# Patient Record
Sex: MALE | Race: WHITE | NOT HISPANIC OR LATINO | URBAN - METROPOLITAN AREA
[De-identification: names, ages, dates, MRNs, and addresses within clinical notes are randomized per-mention and may not be internally consistent; named-entity substitution may affect disease eponyms.]

---

## 2024-11-22 ENCOUNTER — OFFICE VISIT (OUTPATIENT)
Dept: URGENT CARE | Age: 39
End: 2024-11-22
Payer: COMMERCIAL

## 2024-11-22 VITALS
SYSTOLIC BLOOD PRESSURE: 166 MMHG | DIASTOLIC BLOOD PRESSURE: 105 MMHG | TEMPERATURE: 98.6 F | OXYGEN SATURATION: 98 % | HEART RATE: 95 BPM

## 2024-11-22 DIAGNOSIS — U07.1 COVID: Primary | ICD-10-CM

## 2024-11-22 DIAGNOSIS — R05.9 COUGH, UNSPECIFIED TYPE: ICD-10-CM

## 2024-11-22 DIAGNOSIS — J02.9 SORE THROAT: ICD-10-CM

## 2024-11-22 LAB
POC RAPID INFLUENZA A: NEGATIVE
POC RAPID INFLUENZA B: NEGATIVE
POC RAPID STREP: NEGATIVE
POC SARS-COV-2 AG BINAX: ABNORMAL

## 2024-11-22 RX ORDER — METHYLPREDNISOLONE 4 MG/1
TABLET ORAL
Qty: 21 TABLET | Refills: 0 | Status: SHIPPED | OUTPATIENT
Start: 2024-11-22 | End: 2024-11-29

## 2024-11-22 ASSESSMENT — ENCOUNTER SYMPTOMS
CHILLS: 0
FATIGUE: 1
FEVER: 0
CHEST TIGHTNESS: 0
NAUSEA: 0
DIARRHEA: 0
WHEEZING: 0
RHINORRHEA: 1
TROUBLE SWALLOWING: 0
CONSTIPATION: 0
SHORTNESS OF BREATH: 0
COUGH: 1
SORE THROAT: 1
PALPITATIONS: 0

## 2024-11-22 NOTE — LETTER
November 22, 2024     Patient: Alexy Win Jr.   YOB: 1985   Date of Visit: 11/22/2024       To Whom It May Concern:    Alexy Win was seen in my clinic on 11/22/2024 at 9:00 am. Please excuse Alexy for his absence from work until fever free for 24-48 hours and symptoms improved. Expected return 11/25/2024         Sincerely,         Stephenie Pina PA-C        CC: No Recipients

## 2024-11-22 NOTE — PROGRESS NOTES
Urgent Care Virtual Video Visit    Patient Location: ***  Provider Location: {[facility] Locations:59058}    Video visit completed with realtime synchronous video/audio connection. Informed consent was obtained from the patient. Patient was made aware that my evaluation and diagnosis are limited due to the fact that we are not in the same room during the interview and that this is a virtual encounter that took place via videoconferencing. Patient verbalized understanding.     Patient disposition: { Disposition:83393}    Electronically signed by Stephenie Pina PA-C  9:06 AM

## 2024-11-22 NOTE — PATIENT INSTRUCTIONS
Recommended continuing tylenol/ibuprofen for fever. Salt water gargles, steam inhalation    Negative strep and flu testing.     At this time positive covid infection advised to continue supportive measures    Monitor for worsening symptoms, fever not reduced with tylenol/ibuprofen, chest pain, trouble breathing/shortness of breath    Return to work after 24-48 fever free without use of tylenol/ibuprofen.     Otherwise follow up with pediatrician.

## 2024-11-22 NOTE — PROGRESS NOTES
Subjective   Patient ID: Alexy Win Jr. is a 39 y.o. male. They present today with a chief complaint of Sore Throat, Cough, Earache, Sinusitis, and Sinus Problem (3 days.).    History of Present Illness  Patient presents with 3 days of cough, congestion, fatigue, sinus congestion, sore throat. Explains that sore throat is the worse symptom. Denies fever, chills, sweats. Denies n/v/d. Denies chest pain, sob.   Denies history of dm, htn. Has been taking otc decongestants at home with some relief.           Past Medical History  Allergies as of 11/22/2024    (No Known Allergies)       (Not in a hospital admission)       No past medical history on file.    No past surgical history on file.         Review of Systems  Review of Systems   Constitutional:  Positive for fatigue. Negative for chills and fever.   HENT:  Positive for congestion, postnasal drip, rhinorrhea and sore throat. Negative for ear pain and trouble swallowing.    Respiratory:  Positive for cough. Negative for chest tightness, shortness of breath and wheezing.    Cardiovascular:  Negative for chest pain and palpitations.   Gastrointestinal:  Negative for constipation, diarrhea and nausea.   Skin:  Negative for rash.                                  Objective    Vitals:    11/22/24 0853   BP: (!) 166/105   Pulse: 95   Temp: 37 °C (98.6 °F)   SpO2: 98%     No LMP for male patient.    Physical Exam  Constitutional:       General: He is not in acute distress.     Appearance: He is not toxic-appearing.   HENT:      Right Ear: Tympanic membrane and external ear normal.      Left Ear: Tympanic membrane and external ear normal.      Nose: Congestion present.      Right Sinus: No frontal sinus tenderness.      Left Sinus: No frontal sinus tenderness.      Mouth/Throat:      Mouth: Mucous membranes are moist. No oral lesions.      Pharynx: Posterior oropharyngeal erythema and postnasal drip present. No oropharyngeal exudate.   Eyes:      Pupils: Pupils are  equal, round, and reactive to light.   Cardiovascular:      Rate and Rhythm: Normal rate and regular rhythm.   Pulmonary:      Effort: Pulmonary effort is normal. No respiratory distress.      Breath sounds: Normal breath sounds. No wheezing.   Musculoskeletal:      Cervical back: Normal range of motion.   Neurological:      Mental Status: He is alert.         Procedures    Point of Care Test & Imaging Results from this visit  Results for orders placed or performed in visit on 11/22/24   POCT Covid-19 Rapid Antigen   Result Value Ref Range    POC JAJA-COV-2 AG Positive test for SARS-CoV-2 (antigen detected) (A) Presumptive negative test for SARS-CoV-2 (no antigen detected)   POCT Influenza A/B manually resulted   Result Value Ref Range    POC Rapid Influenza A Negative Negative    POC Rapid Influenza B Negative Negative   POCT rapid strep A manually resulted   Result Value Ref Range    POC Rapid Strep Negative Negative      No results found.    Diagnostic study results (if any) were reviewed by Stephenie Pina PA-C.    Assessment/Plan   Allergies, medications, history, and pertinent labs/EKGs/Imaging reviewed by Stephenie Pina PA-C.     Medical Decision Making  MDM- History and examination consistent with viral illness, COVID 19. positive rapid antigen test in clinic - no indication for further imaging or antibiotics. Negative strep, and flu. No evidence of sepsis, strep, pneumonia, otitis, bacterial sinusitis or other bacterial infection. Patient is stable and low risk. Patient educated on cdc quarantine protocols. advised to seek additional medical treatment immediately if any signs of symptoms worsen. Medroldose pack for throat swelling and pain. Patient counseled on supportive measures at home. Patient is encouraged to return to clinic if symptoms change or worsen and will otherwise follow with PCP. Patient verbalized understanding and agrees with plan. Work note provided.     Orders and  Diagnoses  Diagnoses and all orders for this visit:  COVID  -     methylPREDNISolone (Medrol Dospak) 4 mg tablets; Take as directed on package.  Cough, unspecified type  -     POCT Covid-19 Rapid Antigen  -     POCT Influenza A/B manually resulted  Sore throat  -     POCT rapid strep A manually resulted      Medical Admin Record      Patient disposition: Home    Electronically signed by Stephenie Pina PA-C  9:26 AM

## 2025-01-03 ENCOUNTER — OFFICE VISIT (OUTPATIENT)
Dept: URGENT CARE | Age: 40
End: 2025-01-03
Payer: COMMERCIAL

## 2025-01-03 ENCOUNTER — ANCILLARY PROCEDURE (OUTPATIENT)
Dept: URGENT CARE | Age: 40
End: 2025-01-03
Payer: COMMERCIAL

## 2025-01-03 VITALS
RESPIRATION RATE: 18 BRPM | OXYGEN SATURATION: 98 % | TEMPERATURE: 98.1 F | HEART RATE: 91 BPM | DIASTOLIC BLOOD PRESSURE: 90 MMHG | SYSTOLIC BLOOD PRESSURE: 160 MMHG

## 2025-01-03 DIAGNOSIS — J22 LOWER RESPIRATORY TRACT INFECTION: ICD-10-CM

## 2025-01-03 DIAGNOSIS — R05.9 COUGH: ICD-10-CM

## 2025-01-03 DIAGNOSIS — R06.2 WHEEZING: ICD-10-CM

## 2025-01-03 DIAGNOSIS — R05.1 ACUTE COUGH: Primary | ICD-10-CM

## 2025-01-03 PROCEDURE — 71046 X-RAY EXAM CHEST 2 VIEWS: CPT

## 2025-01-03 RX ORDER — AZITHROMYCIN 250 MG/1
TABLET, FILM COATED ORAL
Qty: 6 TABLET | Refills: 0 | Status: SHIPPED | OUTPATIENT
Start: 2025-01-03 | End: 2025-01-08

## 2025-01-03 RX ORDER — PROMETHAZINE HYDROCHLORIDE AND DEXTROMETHORPHAN HYDROBROMIDE 6.25; 15 MG/5ML; MG/5ML
5 SYRUP ORAL EVERY 4 HOURS PRN
Qty: 120 ML | Refills: 0 | Status: SHIPPED | OUTPATIENT
Start: 2025-01-03 | End: 2025-02-02

## 2025-01-03 RX ORDER — ALBUTEROL SULFATE 90 UG/1
2 INHALANT RESPIRATORY (INHALATION) EVERY 6 HOURS PRN
Qty: 18 G | Refills: 0 | Status: SHIPPED | OUTPATIENT
Start: 2025-01-03 | End: 2025-02-02

## 2025-01-03 NOTE — PROGRESS NOTES
Subjective   History of Present Illness: Alexy Win Jr. is a 39 y.o. male. They present today with a chief complaint of Cough (Pt advised that he has a cough with chest congestion for the past week. He advised that he hasn't had a fever or body aches. He is taking Mucinex. ).        Past Medical History  Allergies as of 01/03/2025    (No Known Allergies)       (Not in a hospital admission)       No past medical history on file.    No past surgical history on file.         Review of Systems  Review of Systems                               Objective    Vitals:    01/03/25 0829   BP: 160/90   Pulse: 91   Resp: 18   Temp: 36.7 °C (98.1 °F)   SpO2: 98%     No LMP for male patient.    Physical Exam  Vitals reviewed.   HENT:      Head: Normocephalic and atraumatic.      Nose: Nose normal.      Mouth/Throat:      Mouth: Mucous membranes are moist.   Eyes:      Extraocular Movements: Extraocular movements intact.      Conjunctiva/sclera: Conjunctivae normal.      Pupils: Pupils are equal, round, and reactive to light.   Cardiovascular:      Rate and Rhythm: Normal rate and regular rhythm.   Pulmonary:      Effort: Pulmonary effort is normal.      Breath sounds: Wheezing present.   Skin:     General: Skin is warm.   Neurological:      Mental Status: He is alert and oriented to person, place, and time.   Psychiatric:         Mood and Affect: Mood normal.         Behavior: Behavior normal.             Point of Care Test & Imaging Results from this visit  No results found for this visit on 01/03/25.   XR chest 2 views    Result Date: 1/3/2025  Interpreted By:  Lashae Akins, STUDY: XR CHEST 2 VIEWS;  1/3/2025 8:40 am   INDICATION: Signs/Symptoms:cough.   COMPARISON: None.   ACCESSION NUMBER(S): [id number]   ORDERING CLINICIAN: TOM HOLLY   FINDINGS: The heart is normal in size.   There is no consolidation or pleural fluid.   The mediastinum and bones are unremarkable.   There is surgical clips in the upper abdomen.    COMPARISON OF FINDING:       No acute cardiopulmonary disease.   MACRO: none   Signed by: Lashae Akins 1/3/2025 8:44 AM Dictation workstation:   [id number]     Diagnostic study results (if any) were reviewed by Jonas Mann PA-C.    Assessment/Plan   Allergies, medications, history, and pertinent labs/EKGs/Imaging reviewed by Jonas Mann PA-C.     Medical Decision Making  - CXR shows No acute cardiopulmonary disease. Will treat with Azithromycin, albuterol inh. And promethazine DM.  -         Patient is educated about their diagnoses.     -          Discussed medications benefits and adverse effects.     -          Answered all patient’s questions.     -          Patient will call 911 or go to the nearest ED if worsen symptoms .     -          Patient is agreeable to the plan of care and is deemed stable upon discharge.     -          Follow up with your primary care provider in two days.      Orders and Diagnoses  Diagnoses and all orders for this visit:  Acute cough  -     promethazine-DM (Phenergan-DM) 6.25-15 mg/5 mL syrup; Take 5 mL by mouth every 4 hours if needed for cough.  Wheezing  -     albuterol 90 mcg/actuation inhaler; Inhale 2 puffs every 6 hours if needed for wheezing.  Lower respiratory tract infection  -     azithromycin (Zithromax) 250 mg tablet; Take 2 tabs (500 mg) by mouth today, than 1 daily for 4 days.      Medical Admin Record      Patient disposition: Home    Electronically signed by Jonas Mann PA-C  8:59 AM